# Patient Record
Sex: FEMALE | Race: WHITE | NOT HISPANIC OR LATINO
[De-identification: names, ages, dates, MRNs, and addresses within clinical notes are randomized per-mention and may not be internally consistent; named-entity substitution may affect disease eponyms.]

---
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
## 2020-09-19 ENCOUNTER — TRANSCRIPTION ENCOUNTER (OUTPATIENT)
Age: 20
End: 2020-09-19

## 2020-11-04 ENCOUNTER — TRANSCRIPTION ENCOUNTER (OUTPATIENT)
Age: 20
End: 2020-11-04

## 2021-01-27 ENCOUNTER — APPOINTMENT (OUTPATIENT)
Dept: INTERNAL MEDICINE | Facility: CLINIC | Age: 21
End: 2021-01-27
Payer: COMMERCIAL

## 2021-01-27 ENCOUNTER — NON-APPOINTMENT (OUTPATIENT)
Age: 21
End: 2021-01-27

## 2021-01-27 VITALS
HEIGHT: 67.5 IN | SYSTOLIC BLOOD PRESSURE: 128 MMHG | BODY MASS INDEX: 18 KG/M2 | DIASTOLIC BLOOD PRESSURE: 78 MMHG | HEART RATE: 71 BPM | WEIGHT: 116 LBS | OXYGEN SATURATION: 99 % | TEMPERATURE: 97.3 F

## 2021-01-27 DIAGNOSIS — Z80.8 FAMILY HISTORY OF MALIGNANT NEOPLASM OF OTHER ORGANS OR SYSTEMS: ICD-10-CM

## 2021-01-27 DIAGNOSIS — M79.673 PAIN IN UNSPECIFIED FOOT: ICD-10-CM

## 2021-01-27 PROCEDURE — 99214 OFFICE O/P EST MOD 30 MIN: CPT | Mod: 25

## 2021-01-27 PROCEDURE — 99072 ADDL SUPL MATRL&STAF TM PHE: CPT

## 2021-01-28 PROBLEM — Z80.8 FAMILY HISTORY OF MELANOMA: Status: ACTIVE | Noted: 2021-01-27

## 2021-01-28 NOTE — PHYSICAL EXAM
[Normal Sclera/Conjunctiva] : normal sclera/conjunctiva [Normal TMs] : both tympanic membranes were normal [Pedal Pulses Present] : the pedal pulses are present [Normal] : soft, non-tender, non-distended, no masses palpated, no HSM and normal bowel sounds [No Extremity Clubbing/Cyanosis] : no extremity clubbing/cyanosis [Normal Gait] : normal gait [Alert and Oriented x3] : oriented to person, place, and time [Normal Insight/Judgement] : insight and judgment were intact [de-identified] : moderate acne over forehead and perioral area [de-identified] : slightly anxious appearing

## 2021-01-28 NOTE — REVIEW OF SYSTEMS
[FreeTextEntry8] : Menses regular; pt interested in IUD but has not pursued yet; currently not sexually active; wants to avoid OCPs or hormones [de-identified] : acne, better since she stopped dairy, used to be on oral minocycline, now just topical

## 2021-01-28 NOTE — ASSESSMENT
[FreeTextEntry1] : For HM, will check HIV, pt will get old records from gyne. She needs to check which meningococcal vaccine she received to see which one to complete the series with. Discussed briefly the two IUD formulations, no contraindication for her for either. Can obtain routine gyne care here. Will also send info on calcium, since pt stopped dairy, may not be getting enough.
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
## 2021-01-29 LAB
BASOPHILS # BLD AUTO: 0.05 K/UL
BASOPHILS NFR BLD AUTO: 0.5 %
EOSINOPHIL # BLD AUTO: 0.16 K/UL
EOSINOPHIL NFR BLD AUTO: 1.5 %
HCT VFR BLD CALC: 44.5 %
HGB BLD-MCNC: 14.7 G/DL
HIV1+2 AB SPEC QL IA.RAPID: NONREACTIVE
IMM GRANULOCYTES NFR BLD AUTO: 0.5 %
LYMPHOCYTES # BLD AUTO: 2.68 K/UL
LYMPHOCYTES NFR BLD AUTO: 25 %
MAN DIFF?: NORMAL
MCHC RBC-ENTMCNC: 30.8 PG
MCHC RBC-ENTMCNC: 33 GM/DL
MCV RBC AUTO: 93.3 FL
MONOCYTES # BLD AUTO: 0.57 K/UL
MONOCYTES NFR BLD AUTO: 5.3 %
NEUTROPHILS # BLD AUTO: 7.23 K/UL
NEUTROPHILS NFR BLD AUTO: 67.2 %
PLATELET # BLD AUTO: 229 K/UL
RBC # BLD: 4.77 M/UL
RBC # FLD: 12.6 %
TSH SERPL-ACNC: 1.37 UIU/ML
WBC # FLD AUTO: 10.74 K/UL

## 2021-02-01 ENCOUNTER — NON-APPOINTMENT (OUTPATIENT)
Age: 21
End: 2021-02-01

## 2021-06-30 ENCOUNTER — TRANSCRIPTION ENCOUNTER (OUTPATIENT)
Age: 21
End: 2021-06-30

## 2021-10-14 ENCOUNTER — TRANSCRIPTION ENCOUNTER (OUTPATIENT)
Age: 21
End: 2021-10-14

## 2021-10-18 ENCOUNTER — TRANSCRIPTION ENCOUNTER (OUTPATIENT)
Age: 21
End: 2021-10-18

## 2021-10-19 ENCOUNTER — TRANSCRIPTION ENCOUNTER (OUTPATIENT)
Age: 21
End: 2021-10-19

## 2021-10-26 ENCOUNTER — TRANSCRIPTION ENCOUNTER (OUTPATIENT)
Age: 21
End: 2021-10-26

## 2021-11-24 ENCOUNTER — TRANSCRIPTION ENCOUNTER (OUTPATIENT)
Age: 21
End: 2021-11-24

## 2021-11-25 ENCOUNTER — TRANSCRIPTION ENCOUNTER (OUTPATIENT)
Age: 21
End: 2021-11-25

## 2022-04-12 ENCOUNTER — APPOINTMENT (OUTPATIENT)
Dept: INTERNAL MEDICINE | Facility: CLINIC | Age: 22
End: 2022-04-12
Payer: COMMERCIAL

## 2022-04-12 PROCEDURE — 99213 OFFICE O/P EST LOW 20 MIN: CPT | Mod: 95

## 2022-04-12 RX ORDER — BUSPIRONE HYDROCHLORIDE 5 MG/1
5 TABLET ORAL TWICE DAILY
Qty: 75 | Refills: 2 | Status: DISCONTINUED | COMMUNITY
Start: 2021-01-27 | End: 2022-04-12

## 2022-04-12 NOTE — HISTORY OF PRESENT ILLNESS
[Home] : at home, [unfilled] , at the time of the visit. [Medical Office: ([facility])___] : at the medical office located in  [Verbal consent obtained from patient] : the patient, [unfilled] [FreeTextEntry1] : anxiety  [de-identified] : 22 yo F, f/u for anxiety. Pt is seeing therapist weekly which is helping somewhat. She reports lots of anxiety and frequent panic attacks. Therapist asked pt to ask me about lexapro. Pt has concerns about taking meds, worried about SE. Pt was on buspirone last year, had dizziness so stopped. Pt reports that she does have some perfectly good days, but most days she worries about when she is going to have a panic attack. Is aware of short acting drugs for these but admits that she also had generalized anxiety.

## 2022-04-12 NOTE — PHYSICAL EXAM
[Normal Voice/Communication] : normal voice/communication [No Respiratory Distress] : no respiratory distress  [Speech Grossly Normal] : speech grossly normal [Normal Mood] : the mood was normal [Normal] : affect was normal and insight and judgment were intact

## 2022-04-12 NOTE — HEALTH RISK ASSESSMENT
[2 - 4 times a month (2 pts)] : 2-4 times a month (2 points) [1 or 2 (0 pts)] : 1 or 2 (0 points) [Yes] : In the past 12 months have you used drugs other than those required for medical reasons? Yes [de-identified] : tried marijuana but it made her anxiety worse. [de-identified] : exercises regularly

## 2022-05-04 ENCOUNTER — APPOINTMENT (OUTPATIENT)
Dept: INTERNAL MEDICINE | Facility: CLINIC | Age: 22
End: 2022-05-04
Payer: COMMERCIAL

## 2022-05-04 ENCOUNTER — TRANSCRIPTION ENCOUNTER (OUTPATIENT)
Age: 22
End: 2022-05-04

## 2022-05-04 VITALS
DIASTOLIC BLOOD PRESSURE: 67 MMHG | HEART RATE: 64 BPM | TEMPERATURE: 97.5 F | BODY MASS INDEX: 17.84 KG/M2 | HEIGHT: 67.5 IN | SYSTOLIC BLOOD PRESSURE: 118 MMHG | OXYGEN SATURATION: 99 % | WEIGHT: 115 LBS

## 2022-05-04 DIAGNOSIS — L70.9 ACNE, UNSPECIFIED: ICD-10-CM

## 2022-05-04 PROCEDURE — 99395 PREV VISIT EST AGE 18-39: CPT | Mod: 25

## 2022-05-04 RX ORDER — BENZOYL PEROXIDE 100 MG/ML
10 LIQUID TOPICAL
Qty: 30 | Refills: 0 | Status: DISCONTINUED | COMMUNITY
Start: 2021-01-27 | End: 2022-05-04

## 2022-05-04 NOTE — ASSESSMENT
[FreeTextEntry1] : HM - reviewed recs for cervical cancer screening. Reviewed best hydrating fluids as pt very active physically.

## 2022-05-04 NOTE — HEALTH RISK ASSESSMENT
[Never] : Never [2 - 3 times a week (3 pts)] : 2 - 3  times a week (3 points) [1 or 2 (0 pts)] : 1 or 2 (0 points) [No] : In the past 12 months have you used drugs other than those required for medical reasons? No [0] : 2) Feeling down, depressed, or hopeless: Not at all (0) [PHQ-2 Negative - No further assessment needed] : PHQ-2 Negative - No further assessment needed [de-identified] : dances daily [[id number]] : 0

## 2022-05-04 NOTE — PAST MEDICAL HISTORY
[Menstruating] : menstruating [Menarche Age ____] : age at menarche was [unfilled] [Definite ___ (Date)] : the last menstrual period was [unfilled] [Normal Duration] : the duration was normal [Regular Cycle Intervals] : have been regular [Total Preg ___] : G[unfilled]

## 2022-05-04 NOTE — PHYSICAL EXAM
[Normal Sclera/Conjunctiva] : normal sclera/conjunctiva [Normal TMs] : both tympanic membranes were normal [No Varicosities] : no varicosities [No Edema] : there was no peripheral edema [No Focal Deficits] : no focal deficits [Normal Gait] : normal gait [Normal] : affect was normal and insight and judgment were intact [de-identified] : cystic acne of face and upper back

## 2022-05-04 NOTE — HISTORY OF PRESENT ILLNESS
[FreeTextEntry1] : check up [de-identified] : 20 yo F, h/o anxiety and acne, here for general check up. Saw gyne in June, thinks she had a pap even though not due, had STI testing, wants to find another gyne. Having sex rarely, every 6 months, using condoms, does not want to use hormonal BC. Pt reports with lexapro feels less anxious, although feels more tired, did feel more suicidal for first few days, then this passed. Major concern today is that acne is getting worse, maybe due to stress, wants to be back on the topical that she has taken in the past which worked for her.\par Also has concerns re weight and diet, is vegetarian, not vegan does eat fish, usually misses lunch but snacks throughout the day, but feels hungry, shaky.

## 2022-05-05 LAB
ALBUMIN SERPL ELPH-MCNC: 4.9 G/DL
ALP BLD-CCNC: 68 U/L
ALT SERPL-CCNC: 8 U/L
ANION GAP SERPL CALC-SCNC: 13 MMOL/L
AST SERPL-CCNC: 16 U/L
BASOPHILS # BLD AUTO: 0.06 K/UL
BASOPHILS NFR BLD AUTO: 0.6 %
BILIRUB SERPL-MCNC: 0.4 MG/DL
BUN SERPL-MCNC: 16 MG/DL
CALCIUM SERPL-MCNC: 9.9 MG/DL
CHLORIDE SERPL-SCNC: 101 MMOL/L
CO2 SERPL-SCNC: 24 MMOL/L
CREAT SERPL-MCNC: 0.78 MG/DL
EGFR: 111 ML/MIN/1.73M2
EOSINOPHIL # BLD AUTO: 0.32 K/UL
EOSINOPHIL NFR BLD AUTO: 3.3 %
GLUCOSE SERPL-MCNC: 79 MG/DL
HCT VFR BLD CALC: 44.6 %
HGB BLD-MCNC: 14.7 G/DL
IMM GRANULOCYTES NFR BLD AUTO: 0.4 %
LYMPHOCYTES # BLD AUTO: 2.61 K/UL
LYMPHOCYTES NFR BLD AUTO: 27.2 %
MAN DIFF?: NORMAL
MCHC RBC-ENTMCNC: 30.6 PG
MCHC RBC-ENTMCNC: 33 GM/DL
MCV RBC AUTO: 92.9 FL
MONOCYTES # BLD AUTO: 0.72 K/UL
MONOCYTES NFR BLD AUTO: 7.5 %
NEUTROPHILS # BLD AUTO: 5.83 K/UL
NEUTROPHILS NFR BLD AUTO: 61 %
PLATELET # BLD AUTO: 236 K/UL
POTASSIUM SERPL-SCNC: 4.4 MMOL/L
PROT SERPL-MCNC: 6.9 G/DL
RBC # BLD: 4.8 M/UL
RBC # FLD: 12.9 %
SODIUM SERPL-SCNC: 138 MMOL/L
VIT B12 SERPL-MCNC: 901 PG/ML
WBC # FLD AUTO: 9.58 K/UL

## 2023-01-05 ENCOUNTER — TRANSCRIPTION ENCOUNTER (OUTPATIENT)
Age: 23
End: 2023-01-05

## 2023-01-24 ENCOUNTER — APPOINTMENT (OUTPATIENT)
Dept: INTERNAL MEDICINE | Facility: CLINIC | Age: 23
End: 2023-01-24
Payer: COMMERCIAL

## 2023-01-24 ENCOUNTER — APPOINTMENT (OUTPATIENT)
Dept: OBGYN | Facility: CLINIC | Age: 23
End: 2023-01-24
Payer: COMMERCIAL

## 2023-01-24 VITALS
HEIGHT: 69 IN | BODY MASS INDEX: 16.29 KG/M2 | OXYGEN SATURATION: 97 % | DIASTOLIC BLOOD PRESSURE: 60 MMHG | TEMPERATURE: 97.3 F | HEART RATE: 71 BPM | SYSTOLIC BLOOD PRESSURE: 90 MMHG | WEIGHT: 110 LBS

## 2023-01-24 VITALS
HEIGHT: 69 IN | SYSTOLIC BLOOD PRESSURE: 100 MMHG | WEIGHT: 111 LBS | BODY MASS INDEX: 16.44 KG/M2 | DIASTOLIC BLOOD PRESSURE: 66 MMHG

## 2023-01-24 PROCEDURE — 99214 OFFICE O/P EST MOD 30 MIN: CPT

## 2023-01-24 PROCEDURE — 99203 OFFICE O/P NEW LOW 30 MIN: CPT

## 2023-01-24 RX ORDER — CLINDAMYCIN PHOSPHATE 1 G/10ML
1 GEL TOPICAL DAILY
Qty: 1 | Refills: 2 | Status: ACTIVE | COMMUNITY
Start: 2023-01-24

## 2023-01-24 RX ORDER — CLINDAMYCIN PHOSPHATE AND BENZOYL PEROXIDE 10; 50 MG/G; MG/G
1.2-5 GEL TOPICAL DAILY
Qty: 1 | Refills: 2 | Status: DISCONTINUED | COMMUNITY
Start: 2022-05-04 | End: 2023-01-24

## 2023-01-24 NOTE — PHYSICAL EXAM
[Speech Grossly Normal] : speech grossly normal [Normal Mood] : the mood was normal [Normal] : affect was normal and insight and judgment were intact

## 2023-01-24 NOTE — HISTORY OF PRESENT ILLNESS
[FreeTextEntry1] : here to discuss meds [de-identified] : 22 yo F, h/o anxiety and acne, here to discuss meds. Taking lexapro, 5 mg, reports it worked well for her initially, now thinks she needs more, but is reluctant to take full 10 mg daily, wonders if there is another option/dose. Has vivid dreams for past 2 months, thinks its the meds. Still getting therapy but not as often. Just saw gyne to discuss contraception, likely will opt for IUD, copper, and asked some questions about pros and cons of each. Notes recently (several months) more PMS symptoms, although still wants to get regular menses and  does not want to use hormonal BC. \par
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
## 2023-02-22 RX ORDER — ESCITALOPRAM OXALATE 10 MG/1
10 TABLET ORAL
Qty: 90 | Refills: 1 | Status: ACTIVE | COMMUNITY
Start: 2023-01-24 | End: 1900-01-01

## 2023-03-08 ENCOUNTER — APPOINTMENT (OUTPATIENT)
Dept: OBGYN | Facility: CLINIC | Age: 23
End: 2023-03-08
Payer: COMMERCIAL

## 2023-03-08 VITALS
DIASTOLIC BLOOD PRESSURE: 83 MMHG | HEIGHT: 69 IN | SYSTOLIC BLOOD PRESSURE: 130 MMHG | WEIGHT: 113 LBS | BODY MASS INDEX: 16.74 KG/M2

## 2023-03-08 DIAGNOSIS — Z30.09 ENCOUNTER FOR OTHER GENERAL COUNSELING AND ADVICE ON CONTRACEPTION: ICD-10-CM

## 2023-03-08 DIAGNOSIS — Z30.430 ENCOUNTER FOR INSERTION OF INTRAUTERINE CONTRACEPTIVE DEVICE: ICD-10-CM

## 2023-03-08 LAB — HCG UR QL: NEGATIVE

## 2023-03-08 PROCEDURE — 76857 US EXAM PELVIC LIMITED: CPT

## 2023-03-08 PROCEDURE — 81025 URINE PREGNANCY TEST: CPT

## 2023-03-08 PROCEDURE — 99212 OFFICE O/P EST SF 10 MIN: CPT | Mod: 25

## 2023-03-08 PROCEDURE — 76998 US GUIDE INTRAOP: CPT | Mod: 59

## 2023-03-08 PROCEDURE — 58300 INSERT INTRAUTERINE DEVICE: CPT

## 2023-03-09 LAB
C TRACH RRNA SPEC QL NAA+PROBE: NOT DETECTED
N GONORRHOEA RRNA SPEC QL NAA+PROBE: NOT DETECTED
SOURCE AMPLIFICATION: NORMAL

## 2023-08-02 RX ORDER — ESCITALOPRAM OXALATE 5 MG/1
5 TABLET ORAL
Qty: 90 | Refills: 3 | Status: ACTIVE | COMMUNITY
Start: 2022-04-12 | End: 1900-01-01

## 2023-08-16 ENCOUNTER — APPOINTMENT (OUTPATIENT)
Dept: INTERNAL MEDICINE | Facility: CLINIC | Age: 23
End: 2023-08-16
Payer: COMMERCIAL

## 2023-08-16 VITALS
BODY MASS INDEX: 15.4 KG/M2 | WEIGHT: 104 LBS | TEMPERATURE: 98.4 F | SYSTOLIC BLOOD PRESSURE: 112 MMHG | HEART RATE: 82 BPM | OXYGEN SATURATION: 98 % | HEIGHT: 69 IN | DIASTOLIC BLOOD PRESSURE: 64 MMHG

## 2023-08-16 DIAGNOSIS — Z30.9 ENCOUNTER FOR CONTRACEPTIVE MANAGEMENT, UNSPECIFIED: ICD-10-CM

## 2023-08-16 DIAGNOSIS — F41.9 ANXIETY DISORDER, UNSPECIFIED: ICD-10-CM

## 2023-08-16 DIAGNOSIS — Z00.00 ENCOUNTER FOR GENERAL ADULT MEDICAL EXAMINATION W/OUT ABNORMAL FINDINGS: ICD-10-CM

## 2023-08-16 DIAGNOSIS — N94.3 PREMENSTRUAL TENSION SYNDROME: ICD-10-CM

## 2023-08-16 DIAGNOSIS — N92.0 EXCESSIVE AND FREQUENT MENSTRUATION WITH REGULAR CYCLE: ICD-10-CM

## 2023-08-16 DIAGNOSIS — Z78.9 OTHER SPECIFIED HEALTH STATUS: ICD-10-CM

## 2023-08-16 PROCEDURE — 99214 OFFICE O/P EST MOD 30 MIN: CPT | Mod: 25

## 2023-08-16 PROCEDURE — 99395 PREV VISIT EST AGE 18-39: CPT | Mod: 25

## 2023-08-16 RX ORDER — TRETINOIN 0.5 MG/G
0.05 CREAM TOPICAL
Qty: 1 | Refills: 2 | Status: ACTIVE | COMMUNITY
Start: 2022-05-04 | End: 1900-01-01

## 2023-08-16 RX ORDER — LORAZEPAM 0.5 MG/1
0.5 TABLET ORAL ONCE
Qty: 1 | Refills: 0 | Status: DISCONTINUED | COMMUNITY
Start: 2023-01-24 | End: 2023-08-16

## 2023-08-16 NOTE — HEALTH RISK ASSESSMENT
[No falls in past year] : Patient reported no falls in the past year [Other reason not done] : Other reason not done [None] : None [Fully functional (bathing, dressing, toileting, transferring, walking, feeding)] : Fully functional (bathing, dressing, toileting, transferring, walking, feeding) [Fully functional (using the telephone, shopping, preparing meals, housekeeping, doing laundry, using] : Fully functional and needs no help or supervision to perform IADLs (using the telephone, shopping, preparing meals, housekeeping, doing laundry, using transportation, managing medications and managing finances) [Reports changes in vision] : Reports changes in vision [Never] : Never [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

## 2023-08-16 NOTE — PAST MEDICAL HISTORY
[Menstruating] : menstruating [Menarche Age ____] : age at menarche was [unfilled] [Regular Cycle Intervals] : have been regular [Dysmenorrhea] : dysmenorrhea

## 2023-08-16 NOTE — PHYSICAL EXAM
[No Acute Distress] : no acute distress [Normal Sclera/Conjunctiva] : normal sclera/conjunctiva [Normal TMs] : both tympanic membranes were normal [No Lymphadenopathy] : no lymphadenopathy [Supple] : supple [Thyroid Normal, No Nodules] : the thyroid was normal and there were no nodules present [No Varicosities] : no varicosities [Pedal Pulses Present] : the pedal pulses are present [No Edema] : there was no peripheral edema [No Extremity Clubbing/Cyanosis] : no extremity clubbing/cyanosis [No Rash] : no rash [No Focal Deficits] : no focal deficits [Normal Gait] : normal gait [Speech Grossly Normal] : speech grossly normal [Normal Mood] : the mood was normal [Normal] : affect was normal and insight and judgment were intact [de-identified] : thin [de-identified] : not anxious appearing

## 2023-08-16 NOTE — HISTORY OF PRESENT ILLNESS
[FreeTextEntry1] : CPE [de-identified] : 22 yo F, h/o anxiety and acne, here to discuss meds. Back to taking Lexapro, 5 mg, states the 10 mg made her too drowsy, but now she feels the anxiety again. No longer getting therapy and thinks she needs to restart, wants a new referral.  Acne is ok on only one med. March 2023 had Copper IUD placed, having heavy 3-4 days, with a weeklong menses, some spotting and heavy cramps. Also notes PMS symptoms with nausea during the menses.  Knows she is not eating enough, not enough protein, still adjusting to new job and schedule. Does not eat meat but eats eggs.

## 2023-08-17 LAB
FERRITIN SERPL-MCNC: 48 NG/ML
HCT VFR BLD CALC: 42.4 %
HGB BLD-MCNC: 14 G/DL
MCHC RBC-ENTMCNC: 30.5 PG
MCHC RBC-ENTMCNC: 33 GM/DL
MCV RBC AUTO: 92.4 FL
PLATELET # BLD AUTO: 177 K/UL
RBC # BLD: 4.59 M/UL
RBC # FLD: 12.6 %
TSH SERPL-ACNC: 1.32 UIU/ML
WBC # FLD AUTO: 6.67 K/UL

## 2023-10-31 DIAGNOSIS — M54.9 DORSALGIA, UNSPECIFIED: ICD-10-CM

## 2023-10-31 DIAGNOSIS — G89.29 DORSALGIA, UNSPECIFIED: ICD-10-CM

## 2023-12-01 ENCOUNTER — NON-APPOINTMENT (OUTPATIENT)
Age: 23
End: 2023-12-01

## 2024-01-06 ENCOUNTER — NON-APPOINTMENT (OUTPATIENT)
Age: 24
End: 2024-01-06

## 2024-07-10 ENCOUNTER — APPOINTMENT (OUTPATIENT)
Dept: OBGYN | Facility: CLINIC | Age: 24
End: 2024-07-10
Payer: COMMERCIAL

## 2024-07-10 ENCOUNTER — RESULT REVIEW (OUTPATIENT)
Age: 24
End: 2024-07-10

## 2024-07-10 VITALS — SYSTOLIC BLOOD PRESSURE: 102 MMHG | DIASTOLIC BLOOD PRESSURE: 68 MMHG | WEIGHT: 105.4 LBS

## 2024-07-10 DIAGNOSIS — Z01.419 ENCOUNTER FOR GYNECOLOGICAL EXAMINATION (GENERAL) (ROUTINE) W/OUT ABNORMAL FINDINGS: ICD-10-CM

## 2024-07-10 DIAGNOSIS — N92.0 EXCESSIVE AND FREQUENT MENSTRUATION WITH REGULAR CYCLE: ICD-10-CM

## 2024-07-10 PROCEDURE — 99395 PREV VISIT EST AGE 18-39: CPT
